# Patient Record
Sex: FEMALE | Race: WHITE
[De-identification: names, ages, dates, MRNs, and addresses within clinical notes are randomized per-mention and may not be internally consistent; named-entity substitution may affect disease eponyms.]

---

## 2020-02-24 ENCOUNTER — HOSPITAL ENCOUNTER (INPATIENT)
Dept: HOSPITAL 95 - ER | Age: 65
LOS: 9 days | Discharge: HOSPICE | DRG: 189 | End: 2020-03-04
Attending: INTERNAL MEDICINE | Admitting: INTERNAL MEDICINE
Payer: COMMERCIAL

## 2020-02-24 VITALS — HEIGHT: 64.02 IN | WEIGHT: 70.11 LBS | BODY MASS INDEX: 11.97 KG/M2

## 2020-02-24 DIAGNOSIS — Z66: ICD-10-CM

## 2020-02-24 DIAGNOSIS — F17.210: ICD-10-CM

## 2020-02-24 DIAGNOSIS — K21.9: ICD-10-CM

## 2020-02-24 DIAGNOSIS — J96.22: ICD-10-CM

## 2020-02-24 DIAGNOSIS — F41.0: ICD-10-CM

## 2020-02-24 DIAGNOSIS — J43.9: ICD-10-CM

## 2020-02-24 DIAGNOSIS — R64: ICD-10-CM

## 2020-02-24 DIAGNOSIS — E44.1: ICD-10-CM

## 2020-02-24 DIAGNOSIS — J96.21: Primary | ICD-10-CM

## 2020-02-24 LAB
ANION GAP SERPL CALCULATED.4IONS-SCNC: 2 MMOL/L (ref 6–16)
BASOPHILS # BLD AUTO: 0.02 K/MM3 (ref 0–0.23)
BASOPHILS NFR BLD AUTO: 0 % (ref 0–2)
BUN SERPL-MCNC: 20 MG/DL (ref 8–24)
CALCIUM SERPL-MCNC: 8.5 MG/DL (ref 8.5–10.1)
CHLORIDE SERPL-SCNC: 104 MMOL/L (ref 98–108)
CO2 SERPL-SCNC: 34 MMOL/L (ref 21–32)
CREAT SERPL-MCNC: 0.57 MG/DL (ref 0.4–1)
DEPRECATED RDW RBC AUTO: 54.9 FL (ref 35.1–46.3)
EOSINOPHIL # BLD AUTO: 0.03 K/MM3 (ref 0–0.68)
EOSINOPHIL NFR BLD AUTO: 0 % (ref 0–6)
ERYTHROCYTE [DISTWIDTH] IN BLOOD BY AUTOMATED COUNT: 14.5 % (ref 11.7–14.2)
GLUCOSE SERPL-MCNC: 167 MG/DL (ref 70–99)
HCT VFR BLD AUTO: 43.8 % (ref 33–51)
HGB BLD-MCNC: 13.6 G/DL (ref 11.5–16)
IMM GRANULOCYTES # BLD AUTO: 0.04 K/MM3 (ref 0–0.1)
IMM GRANULOCYTES NFR BLD AUTO: 0 % (ref 0–1)
LYMPHOCYTES # BLD AUTO: 1.01 K/MM3 (ref 0.84–5.2)
LYMPHOCYTES NFR BLD AUTO: 9 % (ref 21–46)
MCHC RBC AUTO-ENTMCNC: 31.1 G/DL (ref 31.5–36.5)
MCV RBC AUTO: 102 FL (ref 80–100)
MONOCYTES # BLD AUTO: 0.52 K/MM3 (ref 0.16–1.47)
MONOCYTES NFR BLD AUTO: 5 % (ref 4–13)
NEUTROPHILS # BLD AUTO: 9.23 K/MM3 (ref 1.96–9.15)
NEUTROPHILS NFR BLD AUTO: 85 % (ref 41–73)
NRBC # BLD AUTO: 0 K/MM3 (ref 0–0.02)
NRBC BLD AUTO-RTO: 0 /100 WBC (ref 0–0.2)
PLATELET # BLD AUTO: 263 K/MM3 (ref 150–400)
POTASSIUM SERPL-SCNC: 3.8 MMOL/L (ref 3.5–5.5)
SODIUM SERPL-SCNC: 140 MMOL/L (ref 136–145)

## 2020-02-24 NOTE — NUR
SUMMARY
PT ADMITTED TO THE FLOOR FROM THE ER, SHE IS ON 2 L O2 VIA NC WHICH IS HER
BASELINE, O2 SATS >95%. LUNGS ARE DIMINISHED THROUGH OUT
W/WHEEZES,LABORED,TACHY. PRN & SCHEDULED BREATHING TREATMENTS HAVE BEEN
ORDERED. PT IS A STAND BY ASSIST TO THE BSC. NS @75 ML/HR INFUSING. PT ENC TO
CALL FOR ASSISTANCE PRIOR TO GETTING OOB. CALL LIGHT IN REACH, WCTED & REPORT
TO NOC RN.

## 2020-02-24 NOTE — NUR
PERMISSION TO PROVIDE CARE
PRISCILA BUSTAMANTE, A STUDENT NURSE
RECEIVED PERMISSION TO PROVIIDE CARE TO THIS PATIENT ON
2- FROM THE CHARGE NURSE ON PCU

## 2020-02-25 LAB
ANION GAP SERPL CALCULATED.4IONS-SCNC: 4 MMOL/L (ref 6–16)
BASOPHILS # BLD AUTO: 0.01 K/MM3 (ref 0–0.23)
BASOPHILS NFR BLD AUTO: 0 % (ref 0–2)
BUN SERPL-MCNC: 16 MG/DL (ref 8–24)
CALCIUM SERPL-MCNC: 8.3 MG/DL (ref 8.5–10.1)
CHLORIDE SERPL-SCNC: 107 MMOL/L (ref 98–108)
CO2 SERPL-SCNC: 31 MMOL/L (ref 21–32)
CREAT SERPL-MCNC: 0.57 MG/DL (ref 0.4–1)
DEPRECATED RDW RBC AUTO: 53.2 FL (ref 35.1–46.3)
EOSINOPHIL # BLD AUTO: 0 K/MM3 (ref 0–0.68)
EOSINOPHIL NFR BLD AUTO: 0 % (ref 0–6)
ERYTHROCYTE [DISTWIDTH] IN BLOOD BY AUTOMATED COUNT: 14.4 % (ref 11.7–14.2)
GLUCOSE SERPL-MCNC: 119 MG/DL (ref 70–99)
HCT VFR BLD AUTO: 39.3 % (ref 33–51)
HGB BLD-MCNC: 12.5 G/DL (ref 11.5–16)
IMM GRANULOCYTES # BLD AUTO: 0.02 K/MM3 (ref 0–0.1)
IMM GRANULOCYTES NFR BLD AUTO: 0 % (ref 0–1)
LYMPHOCYTES # BLD AUTO: 0.47 K/MM3 (ref 0.84–5.2)
LYMPHOCYTES NFR BLD AUTO: 10 % (ref 21–46)
MCHC RBC AUTO-ENTMCNC: 31.8 G/DL (ref 31.5–36.5)
MCV RBC AUTO: 100 FL (ref 80–100)
MONOCYTES # BLD AUTO: 0.2 K/MM3 (ref 0.16–1.47)
MONOCYTES NFR BLD AUTO: 4 % (ref 4–13)
NEUTROPHILS # BLD AUTO: 3.88 K/MM3 (ref 1.96–9.15)
NEUTROPHILS NFR BLD AUTO: 85 % (ref 41–73)
NRBC # BLD AUTO: 0 K/MM3 (ref 0–0.02)
NRBC BLD AUTO-RTO: 0 /100 WBC (ref 0–0.2)
PLATELET # BLD AUTO: 240 K/MM3 (ref 150–400)
POTASSIUM SERPL-SCNC: 4.1 MMOL/L (ref 3.5–5.5)
SODIUM SERPL-SCNC: 142 MMOL/L (ref 136–145)

## 2020-02-25 NOTE — NUR
ASSUMED PATIENT CARE. PATIENT RECIEVING RESPIRATORY TREATMENT WITH RT AT
BEDSIDE. PATIENT ENDORSES THAT THE RESPIRATORY TREATMENTS ARE HELPING. NO
SIGNS OF ACUTE DISTRESS. WCTM.

## 2020-02-25 NOTE — NUR
NO ACUTE EVENTS THIS SHIFT. DECREASED WORK OF BREATHING THROUGH THIS SHIFT.
DECREASED SEVERITY OF WHEEZES NOTED BY THIS RN COMPARED TO LUNG SOUNDS AT
START OF SHIFT. PATIENT EXPRESSED FEELING LIKE HER "BREATHING IS GETTING
BETTER." PATIENT MAINTAINED O2 SATURATION IN THE HIGH 90S THIS SHIFT ON 2-2.5
L BY NASAL CANNULA.

## 2020-02-25 NOTE — NUR
PATIENT RESTING IN BED, SOB WITH SLIGHT ACTIVITY. ON 2L/NC WITH BIOX 93-95%
LUNG SOUNDS WHEEZES T/O, AND DIFFICULT TO TAKE DEEP BREATH. PATIENT UP TO BSC
WITHOUT DIFFICULTY. PATIENT PATRICIA PO WITHOUT DIFFICULTY.

## 2020-02-25 NOTE — NUR
NIGHT SHIFT SUMMARY
NO ACUTE CHANGES THIS SHIFT. PT AAOX4 AND PLEASANT. CALLS APPROPRIATELY FOR
ASSISTANCE. CONTINUES ON 2L O2 VIA NC WHICH IS HOME DOSE. IV SOLUMEDROL Q6. PT
REPORTS IMPROVEMENT IN BREATHING. STILL WHEEZY T/O. VSS, WILL CONTINUE TO
MONITOR.

## 2020-02-26 LAB
ANION GAP SERPL CALCULATED.4IONS-SCNC: 1 MMOL/L (ref 6–16)
BASOPHILS # BLD AUTO: 0.01 K/MM3 (ref 0–0.23)
BASOPHILS NFR BLD AUTO: 0 % (ref 0–2)
BUN SERPL-MCNC: 16 MG/DL (ref 8–24)
CALCIUM SERPL-MCNC: 8.4 MG/DL (ref 8.5–10.1)
CHLORIDE SERPL-SCNC: 106 MMOL/L (ref 98–108)
CO2 SERPL-SCNC: 34 MMOL/L (ref 21–32)
CREAT SERPL-MCNC: 0.53 MG/DL (ref 0.4–1)
DEPRECATED RDW RBC AUTO: 52.4 FL (ref 35.1–46.3)
EOSINOPHIL # BLD AUTO: 0 K/MM3 (ref 0–0.68)
EOSINOPHIL NFR BLD AUTO: 0 % (ref 0–6)
ERYTHROCYTE [DISTWIDTH] IN BLOOD BY AUTOMATED COUNT: 14.2 % (ref 11.7–14.2)
GLUCOSE SERPL-MCNC: 121 MG/DL (ref 70–99)
HCT VFR BLD AUTO: 38.2 % (ref 33–51)
HGB BLD-MCNC: 12.1 G/DL (ref 11.5–16)
IMM GRANULOCYTES # BLD AUTO: 0.02 K/MM3 (ref 0–0.1)
IMM GRANULOCYTES NFR BLD AUTO: 0 % (ref 0–1)
LYMPHOCYTES # BLD AUTO: 0.55 K/MM3 (ref 0.84–5.2)
LYMPHOCYTES NFR BLD AUTO: 8 % (ref 21–46)
MCHC RBC AUTO-ENTMCNC: 31.7 G/DL (ref 31.5–36.5)
MCV RBC AUTO: 100 FL (ref 80–100)
MONOCYTES # BLD AUTO: 0.28 K/MM3 (ref 0.16–1.47)
MONOCYTES NFR BLD AUTO: 4 % (ref 4–13)
NEUTROPHILS # BLD AUTO: 6.45 K/MM3 (ref 1.96–9.15)
NEUTROPHILS NFR BLD AUTO: 88 % (ref 41–73)
NRBC # BLD AUTO: 0 K/MM3 (ref 0–0.02)
NRBC BLD AUTO-RTO: 0 /100 WBC (ref 0–0.2)
PH BLDV: 7.45 [PH] (ref 7.34–7.37)
PLATELET # BLD AUTO: 233 K/MM3 (ref 150–400)
POTASSIUM SERPL-SCNC: 4.2 MMOL/L (ref 3.5–5.5)
SODIUM SERPL-SCNC: 141 MMOL/L (ref 136–145)

## 2020-02-26 NOTE — NUR
TRANSFER NOTE
HANDOFF RECEIVED FROM PCU NURSE CASEY. PT TRANSFERED TO MED FLOOR VIA
WHEELCHAIR. PERSONAL POSSESSIONS BROUGHT WITH PT. PT ORIENTED TO UNIT. CALL
BUTTON WITHIN REACH.

## 2020-02-26 NOTE — NUR
SUMMARY
PATIENT SLEEPING OFF AND ON T/O NIGHT. LUNG SOUNDS REMAIN DECREASED WITH
WHEEZES T/O. SOB WITH ACTIVITY, BUT IS ABLE TO TRANSFER SELF TO BSC WITHOUT
DIFFICULTY. PATIENT VERBALIZED THAT SHE IS FEELING BETTER THIS MORNING AND IS
WANTING TO TRY TO TAKE A SHOWER TODAY.

## 2020-02-26 NOTE — NUR
ASSUMED PATIENT CARE. PATIENT RESTING COMFORTABLY IN BED, COMVERSING WITH
NURSING STAFF. NO SIGNS OF ACUTE RESPIRATORY DISTRESS, WCTM.

## 2020-02-27 NOTE — NUR
SHIFT SUMMARY
ADMITTED FOR RESPIRATORY FAILURE. FOUND TO HAVE COPD EXACERBATION. DNR CODE.
ORAL PREDNISONE AND ZITHROMAX IS IN EMAR. RT TX'S ARE SCHEDULED. I DID CALL
NIGHT HOSPITALIST FOR [facility] TID PRN THIS SHIFT DUE TO HER SEVERE
COUGHING SPELLS. SHE IS ON A REGULAR DIET, A&O X4, AND ON 2 LPM O2 WHICH IS
HER BASELINE AT HOME. SHE LIVES WITH HER , AND SHE IS A CURRENT SMOKER.
STANDBY ASSIST TO BATHROOM DUE TO SEVERE DYSPNEA. HX: COPD, MALNUTRITION,
GERD. QUANTIFERON GOLD TEST RESULTS ARE PENDING (RE:LATENT TB).

## 2020-02-27 NOTE — NUR
RECEIVED BEDSIDE REPORT FROM NAY CHEUNG. PT SITTING UP IN BED. RESP SHALLOW ON
2L VIA NC, 2L IS PT'S BASELINE AT HOME. NOTED SOB AT REST. PT STATES SHE'S
MUCH BETTER THAN WHEN SHE CAME IN AND IS HOPING TO BE DISCHARGED IN THE
MORNING. WILL MONITOR AND PROVIDE CARE T/O SHIFT. CALL LT IN REACH.

## 2020-02-28 NOTE — NUR
SHIFT SUMMARY
PT AWAKE DURING SHIFT REPORT, BECOMING A LITTLE SOB TRYING TO TALK AND SIT UP.
PER SHIFT REPORT, PT FEELS THAT SHE IS AT HER BASELINE. PT CURRENTLY ON 2L NC
AND HAD JUST GOTTEN O2 AT HOME 2 DAYS BEFORE BEING ADMITTED TO HOSPITAL. PT
ALSO REPORTED THAT SHE WAS STILL SMOKING UNTIL MONDAY. PT ENCOURAGED TO QUIT.
DR SAAB IN TO SEE PT; OFFERED PT NICOTINE PATCH OR SOMETHING ELSE TO HELP
HER QUIT. PT DECLINED AND REPORTED THAT SHE DIDN'T NEED ANYTHING. PT HAD
WANTED TO GO HOME. HOWEVER, AFTER GETTING UP TO [facility], PT BECAME VERY SOB WITH
HIGH ANXIETY. DR SAAB ENCOURAGED PT TO STAY ONE MORE DAY WHILE SWITCHING TO
PO STEROIDS. PT REQUESTED SOMETHING TO HELP HER "TAKE A NAP", AS SHE HAS NOT
BEEN ABLE TO SLEEP WHILE IN HOSPITAL, WHICH "HAS INCREASED MY ANXIETY". ATIVAN
0.5 MG X1 ORDERED AND GIVEN. PT THEN AGREEABLE TO WHAT DR SAAB THOUGHT WAS
BEST. PT HAS NOT BEEN EATING MUCH AT ALL; PT ENCOURAGED TO IMPROVE NUTRITION.
IDEAS DISCUSSED ON HOW TO BEST DO THAT. PT RESTING QUIETLY AT THIS TIME. CALL
LT IN REACH. ABLE TO MAKE NEEDS KNOWN.

## 2020-02-28 NOTE — NUR
SHIFT SUMMMARY:
PT CONTINUES TO BE SOB, HOWEVER, IS AT BASELINE FOR OXYGEN NEEDS AT 2L VIA NC.
NO COMPLAINTS OF PAIN, OR NAUSEA. REPORTED NO SPUTUM DURING SHIFT. COUGHED
VERY LITTLE DURING SHIFT. NO ACUTE CHANGES.  RESTED COMFORTABLY.
WILL CONTINUE TO MONITOR AND PROVIDE CARE UNTIL SHIFT REPORT.

## 2020-02-28 NOTE — NUR
PT WAS ABLE TO REST WELL AFTER ATIVAN GIVEN EARLIER TODAY. PT FELT BETTER WHEN
WAKING, BUT LATER BECAME SOB AND NEEDED RT TX'S. RT NOTIFIED. PT SITTING ON
SIDE OF BED BECOMING MORE ANXIOUS WAITING FOR RT. BY THE TIME RT TX'S WERE
COMPLETE, PT'S ANXIETY CONTINUED TO INCREASE STATING TO RT THAT SHE WASN'T
GOING TO MAKE IT. THIS RN WAS NOTIFIED BY RT OF PT'S CONTINUING STATUS. DR SAAB NOTIFIED OF PT'S DECLINE. NEW ORDERS RECEIVED. PT LUNGS T/O WITH
INCREASED WHEEZES AND WET. ATIVAN X1 GIVEN AGAIN. ORDERS TO TX PT TO PCU ON
BIPAP IF NEEDED. PT APPEARS TO HAVE CALMED DOWN SOME AT THIS TIME. PT REPORTED
THAT SHE DOES THIS SAME THING AT HOME AND WAITS TOO LONG TO CALL 911, WHEN SHE
CAN'T BREATHE. REPORT GIVEN TO ONCOMING RN.

## 2020-02-29 LAB
GAMMA INTERFERON BACKGROUND BLD IA-ACNC: 0.1 IU/ML
M TB IFN-G CD4+ BCKGRND COR BLD-ACNC: 0.22 IU/ML
M TB IFN-G CD4+CD8+ BCKGRND COR BLD-ACNC: 0.09 IU/ML
MITOGEN IGNF BLD-ACNC: 6.28 IU/ML

## 2020-02-29 NOTE — NUR
Rn summary:  Patient is alert and oriented, very soft spoken due to
respiratory distress.  Pt was on 5 liters at beginning of shift, down to 4
liters, sats 97% at rest.  Pt has been able to sleep most of shift.  Did
receive ativan at end of day shift.  Pt has been calm and free of distress.
Breath sounds with crackles mid to upper lobes, diminished in bases with fine
expiratory wheezes, course wheezes anteriorly.
Pt was awake about 0330, she was dangling at bedside eating a snack.  SOB.
Pt had walked into BR instead of using BSC to conserve respiratory reserves.
Bed alarm on for safety.  Call light in reach.

## 2020-02-29 NOTE — NUR
NO ACUTE CHANGES NOTED. PATIENT IS DOING WELL ON 1 LITER O2 VIA NASAL CANULA.
NO COMPLAINTS OF SHORTNESS OF BREATH OR ANXIETY NOTED. NO CURRENT COMPLAINTS
OF PAIN OR DISCOMFORT NOTED. WILL CONTINUE TO MONITOR FOR CHANGES.

## 2020-03-01 LAB — PH BLDA: 7.34 [PH] (ref 7.35–7.45)

## 2020-03-01 NOTE — NUR
Update
Pt took all PO medications whole with water. No coughing or difficulty
swallowing noted. Pt laughing and engaging with family, though appears
frail and weak.

Family left bedside around 2200 and BIPAP placed on Pt and sleep
encouraged. Jelly placed to pt's nose for skin protection.  Family names
and numbers placed on whiteboard in room and pt's daughter in law to stay
the night. Recliner placed in room and set up for pt's daughter. Will
continue to monitor.

## 2020-03-01 NOTE — NUR
ABG RESULTS CALLED TO DR SAAB. PER DR SAAB GO AHEAD AND GIVE ORAL ATIVAN
0.5MG AND HE WILL BE UP TO SEE PT AND FAMILY SHORTLY.

## 2020-03-01 NOTE — NUR
PT CONT TO STRUGGLE TO BREATHE, PT INITIALLY TOOK BIPAP OFF APROX 1 HR AFTER
PLACING AND REPORTS SHE COULD NOT TOLERATE IT. FAMILY AND DR SAAB AT BEDSIDE
AND ENCOURAGED PT TO TRY AGAIN. PT PLACED BACK ON BIPAP REQUIRING A 6L BLEED
TO GET SATS TO 90%. PT DROWSY, WILL OPEN EYES TO VERBAK STIMULI BUT DOES NOT
VERBALLY RESPOND. RESP TACHY AS WELL AS HR IN 'S. REPORT CALLED TO PCU,
AWAITING TRANSFER AT THIS TIME. FAMILY AT BEDSIDE.

## 2020-03-01 NOTE — NUR
SHIFT SUMMARY
ASSUMED CARE OF PT AT 1900. PT IS A/O X4, DENIES N/T IN EXTRMIEITES AT THIS
TIME. HEART SOUNDS REGULAR, DENIES CP AT THIS TIME. LUNG SOUNDS CRACKLES IN
THE APEXS AND WHEEZING T/O. PT STATES THAT SHE THOUGHT SHE WAS GETTING TO MUCH
O2 AND WANTED TO TAKE OFF THE NC BUT UPON ASSESSMENT PT SATURATION WAS IN THE
80'S, PT PUT THE NC BACK IN HER NOSE AND HE SATURATION WENT TO 91% ON 2L, RT
CONSULTED WITH CARE.
 
NO ACUTE CHANGES NOTED T/O THE NIGHT, PT SLEPT T/O THE NIGHT WITHOUT ANY
COMPLAINTS. CALL LIGHT IN REACH, BED IN LOWEST POSTION, WILL CONTINUE TO
MONITOR UNTIL DAYSHIFT NURSE ARRIVES.

## 2020-03-01 NOTE — NUR
Pt resting in bed upon arrival. Pt denies pain at this time. Pt appears
dyspneic as evidenced by respiratory rate and work of breathing. Engaged in
therapeutic discussion regarding goals of care including hospice and comfort
care as an option. Pt and family do not indicate what they are considering at
this time. Pt is focussed on her axiety at this time. Educated Pt on
relaxation and imagery technique to help manage anxiety. Pt will be able to
receive Ativan in approximately 1 hour. Pt is agreeable with waiting.
 
Spoke with bedside RN's Stella. Discussed case and concerns.
 
Palliative Care will remain available.

## 2020-03-01 NOTE — NUR
PATIENT ARRIVED TO THIS UNIT THIS EVENING. PATIENT IS IN END STAGE COPD, ON
BIPAP. FAMILY WAS AT BEDSIDE THROUGHOUT THIS PART OF SHIFT, PALLIATIVE CARE
FOLLOWING. CONVERSATIONS TO FOLLOW REGARDING POSSIBLE COMFORT CARE STATUS.
ATIVAN WAS IN USE THIS MORNING ON MED FLOOR, PATIENT HAS NOT NEEDED ANXIETY
MANAGEMENT SINCE ARRIVING TO PCU.

## 2020-03-01 NOTE — NUR
Assumed Care
Pt presents asleep, wearing BIPAP, family at bedside tearful. Family
expressing to this RN about "a rough day" stating "they say she's going
to die." This RN offered therapeutic listening and communication. This RN
expressed deep sympathy for the difficult time the whole family is going
through. This RN offered hugs and words of encouragement. Family
responded positively to communication offered. This RN also educated
family on COPD.
 
The pt's VSS, opens eyes to verbal stimulous but not engaging in
conversation. See shift assessment for detailed systems assessment.
 
Will continue to montior and offer support for pt and family.

## 2020-03-01 NOTE — NUR
Update
RT at bedside for assessment. Pt with increased alertness as compared to
previous interaction. Bipap removed from pt and NC placed, pt tolerating
7L NC. Engaging in converstaion with family. Alert and oriented to name,
, place, following directions, family. Pt tearful stating "I don't
want to die". This RN at bedside with family and pt offering support and
care. Oral care performed and pt's hair brushed. Will continue to
monitor.

## 2020-03-01 NOTE — NUR
PT CALLED AND REPORTED FEELING SHORT OF BREATH AND DIFFICULTY WITH BREATHING.
LS DIMINISHED WITH WHEEZES AND CRACKLES NOTED. RT CALLED FOR BREATHING TX. PT
HAD PREVIOUSLY WITH SATS AT 87% ON 2L, 02 INCREASED TO 3L. PT CURRENTLY WITH
SATS BACK AT 87% ON 3L, INCREASED TO 90% ON 3L WITH BREATHING TX. DR SAAB IN
TO SEE PT AND REPORTS WILL RETURN. PT HAVING DIFFICULTY SPEAKING IN SENTENCES
AND ONLY ABLE TO SAY 2-3 WORDS AT A TIME. WILL CONT TO MONITOR AT THIS TIME.
PT DOES REPORT BREATHING TX HELPED.

## 2020-03-01 NOTE — NUR
ASSUMED PATIENT CARE. PATIENT RESTING IN BED, EQUAL BILATERAL CHEST RISE WITH
BREATH. PATIENT HAS BIPAP ON. FAMILY AT BEDSIDE, PALLIATIVE CARE PRESENT AT
BEDSIDE. WCTM.

## 2020-03-01 NOTE — NUR
FAMILY AT BEDSIDE AND ARE VERY CONCERNED ABOUT PT AND REPORT SHE LOOKS WORSE
THEN SHE DID WHEN SHE CAME IN. DR SAAB IN TO SEE PT AND SPOKE WITH PT AND
FAMILY. ABG ORDERED AND CHEST XRAY. PALLIATIVE CARE AND DIETICIAN CONSULT ALSO
PLACED, PT WITH NO APPETITE AND VERY POOR ORAL INTAKE, PT REPORTS SHE DOES NOT
EAT HOME EITHER. PT DID AGREE TO TAKE AN ENSURE.

## 2020-03-02 NOTE — NUR
NEAR FALL EVENT
Pulse oximetry alarm sounding, this RN to room to assess pt and found pt
walking to bathroom without assistance. Pt's daughter in law in room at
this time. Pt lost balance and this RN assisted pt to find balance again.
This was a near fall event. Pt walked back to bed by this RN. Pt and Pt's
daughter in law educated strongly on need to call for assistance with
ambulation. Pt verbalized understanding. Call light placed in bed, bed
alarm on. Will continue to closely monitor. This pt is high fall risk.

## 2020-03-02 NOTE — NUR
PATIENT WAS ON NASAL CANNULA 2L MAJORITY OF SHIFT, THIS AFTERNOON HAD
INCREASED WORK OF BREATHING AFTER ACTIVITY AND REQUIRED APPLICATION OF BIPAP.
PLAN IS TO CONTINUE ON IV STEROIDS AND RT BREATHING TX.  PATIENT HAS INCREASED
ANXIETY WITH BIPAP APPLICATION, ATIVAN GIVEN AND PROVED EFFECTIVE FOR ANXIETY
MANAGEMENT AS WELL AS APPLICATION OF COOL WASHCLOTHS AND DISTRACTION.
PATIENT'S APPETITE REMAINS MINIMAL, ENCOURAGED PO INTAKE WITH REQUEST FOR EGGS
IN THE MORNING. DIETICIAN FOLLOWING. PULM TO CONSULT.  PATIENT'S BP HAS
TRENDED UP THIS SHIFT, FROM 135/79 /89. MONITORED, PATIENT DENIED CHEST
PAIN, DENIED BACK PAIN.

## 2020-03-02 NOTE — NUR
Pt resting in bed upon arrival and just finished a breathing treatment. Pt
denies pain at this time. Pt appears dyspneic as evidenced by work of
breathing when speaking. Engaged in therapeutic listening as Pt discusses her
condition and prognosis. She asks "Are they going to put me on hospice"?
Educated Pt that hospice is appropriate for her but ultimately it needs to
line up with her goals with her making the decision. Educated Pt if she
chooses hospice, it does not mean that she will pass away imediately and that
it becomes her journey. Suggested to Pt having a conversation with family and
allow concerns and fears to be expressed. No other concerns reported at this
time.
 
Continued conversation with Pt's daughter in law outside of Pt's room.
Answered questions and concerns.
 
Palliative Care will remain available.

## 2020-03-02 NOTE — NUR
ASSUMED PATIENT CARE. PATIENT RESTING COMFORTABLY IN BED, NASAL CANNUAL ON AT
2L. PATIENT CONVERSING COMFORTABLY WITH NURSING STAFF. EQUAL BILATERAL CHEST
RISE WITH BREATH, NO SIGNS OF ACUTE DISTRESS. WCTM.

## 2020-03-03 LAB
BASOPHILS # BLD AUTO: 0.01 K/MM3 (ref 0–0.23)
BASOPHILS NFR BLD AUTO: 0 % (ref 0–2)
DEPRECATED RDW RBC AUTO: 49.6 FL (ref 35.1–46.3)
EOSINOPHIL # BLD AUTO: 0 K/MM3 (ref 0–0.68)
EOSINOPHIL NFR BLD AUTO: 0 % (ref 0–6)
ERYTHROCYTE [DISTWIDTH] IN BLOOD BY AUTOMATED COUNT: 13.2 % (ref 11.7–14.2)
HCT VFR BLD AUTO: 43.5 % (ref 33–51)
HGB BLD-MCNC: 13.4 G/DL (ref 11.5–16)
IMM GRANULOCYTES # BLD AUTO: 0.03 K/MM3 (ref 0–0.1)
IMM GRANULOCYTES NFR BLD AUTO: 0 % (ref 0–1)
LYMPHOCYTES # BLD AUTO: 0.48 K/MM3 (ref 0.84–5.2)
LYMPHOCYTES NFR BLD AUTO: 5 % (ref 21–46)
MCHC RBC AUTO-ENTMCNC: 30.8 G/DL (ref 31.5–36.5)
MCV RBC AUTO: 101 FL (ref 80–100)
MONOCYTES # BLD AUTO: 0.37 K/MM3 (ref 0.16–1.47)
MONOCYTES NFR BLD AUTO: 4 % (ref 4–13)
NEUTROPHILS # BLD AUTO: 9.31 K/MM3 (ref 1.96–9.15)
NEUTROPHILS NFR BLD AUTO: 91 % (ref 41–73)
NRBC # BLD AUTO: 0 K/MM3 (ref 0–0.02)
NRBC BLD AUTO-RTO: 0 /100 WBC (ref 0–0.2)
PH BLDA: 7.34 [PH] (ref 7.35–7.45)
PLATELET # BLD AUTO: 248 K/MM3 (ref 150–400)

## 2020-03-03 NOTE — NUR
03/03/20   0618   PULSE OXIMETER ALARMING AND PT HAD TAKEN OFF BIPAP. O2 SATS
IN HIGH 50'S TO 60'S.  RESP.CARE AND 2 RN'S IN ROOM AS PRIMARY RN WAS IN
ANOTHER PT'S ROOM DOWN THE GRANT. RT WAS ABLE TO BRING O2 SATS UP. SEE RT
NOTES. PT NOW AT 96% O2 WITH BIPAP BLEEDING AT 9LPM. PT ASSISTED UP TO BSC FOR
VOIDING AND ASSISTED BACK TO BED WITHOUT PROBLEMS. FAMILY AT BEDSIDE.

## 2020-03-03 NOTE — NUR
Pt visit this afternoon. Pt reports she has chosen to D/C home with hospice.
Confirmed Pt's understanding of hospice philosophy with Pt's understanding
appropriate. Family is agreeable. Syd Vazquez will present hospice
agencies available to choose. Family reports they will discuss further on if
comfort measures should be started during Pt's hospital stay. No other
concerns reported at this time.
 
Palliative Care will remain available.

## 2020-03-03 NOTE — NUR
SHIFT SUMMARY
PT ALERT AND ORIENTED, BUT LETHARGIC. O2 SATS HAVE REMAINED ABOVE 90% WITH
BIPAP WITH 5-9L BLEED IN DEPENDING ON NEEDS. PT DESATURATES WIT MINIMAL
ACTIVITY AND CAN TOLERATE BREAKS FROM BIPAP ON 4L NC. PT DENIES ANY PAIN. BP
STABLE. HR NSR. PT REQUESTED INFORMATION ON HOSPICE TODAY. DISCHARGE PLANNING
HAS ARRANGED DC HOME ON HOSPICE TOMORROW. FAMILY AT BEDSIDE. BED ALARM ON FOR
SAFETY.

## 2020-03-03 NOTE — NUR
03/03/20   0520   PT SLEEPING AND WAS PUT BACK ON BIPAP PER RESP.CARE AFTER
ABG REPORT SHOWED CO2= 79 ON O2 N/C AT 3LPM. VITALS STABLE AND NO C/O S/S OR
PROBLEMS FROM PATIENT. FAMILY AT BEDSIDE ALL SHIFT AND ASSIST HER WITH CARE.
SOB NOTED EARLIER IN SHIFT WITH ANY ACTIVITY INCLUDING TALKING.

## 2020-03-03 NOTE — NUR
Initial spiritual care note:
 
Met with Paola alone at bedside.  She appears quite frail.  She admits she is
tired of suffering and knows she is nearing end-of-life.  She is primarily
concerned about burdening her son, as he is suffering some physical ailments.
Her DILShanti is a great support to pt.  Paola's  battles numerous
addictions and has been the cause of great turmoil at home.  I gently
explained hospice services.  Paola very much wants to be home.  I provided
 and assurance of care.  SAMI arrived and joined the conversation.
Shanti agrees that pt has suffered "long enough" and promises to "take care of
things at home."  There is a deep bond between these two women.  I affirmed
obvious love, provided anticipatory berevament  to good effect.
INformed RN of pt/family wishes.  I will remain available.

## 2020-03-03 NOTE — NUR
Supportive therapeutic visit this AM. Pt resting in bed and denies pain at
this time. Pt appears dyspneic as evidenced of work of breathing, accesory
muscle use, and speaking in short sentences. Listened as Pt reports very
little appetite. Offered therapeutic listening. No family at bedside
currently. Instructed Pt this RN will make another visit when family is
present.
 
Spoke with bedside RN Karlie and discussed case.
 
Palliative Care will remain available.

## 2020-03-04 NOTE — NUR
SHIFT SUMMARY
 
PT A&O X4, PLEASANT, CALM & COOPERATIVE. PT'S DAUGHTER-IN-LAW AT BEDSIDE T/O
SHIFT. PT DENIES PAIN, BUT REPORTS DISCOMFORT W/ BREATHING. LUNG SOUNDS W/
EXPIRATORY WHEEZE NOTED. SPO2 > 90% ON 2-15L HI-ADAM NC OR BIPAP 15/8 W/ 15L
BLEED IN THIS SHIFT. PT O2 NEEDS FLUCTUATING GREATLY T/O SHIFT WHILE AT REST
AND W/ ACTIVITY. PT LABORING TO BREATHE W/ PT & PT DAUGHTER-IN-LAW EXPRESSING
READINESS TO BEGIN COMFORT CARE MEASURES TONIGHT, BUT ALSO WANTING TO CONTINUE
CURRENT MEDICATION TX. CALL TO NP RHINA W/ UPDATE ON PT'S LABOROUS BREATHING
AND REQUEST TOWARDS COMFORT CARE. NP RHINA W/ NEW ORDERS FOR PRN MORPHINE,
SEE EMAR. PT REQUESTING 1 MG IV MORPHINE, ADMINISTERED PER PT REQUEST/EMAR
X2 THIS SHIFT. PT REPORTING IMPROVEMENT W/ BREATHING AFTER MORPHINE
ADMINISTRATION. PT NOT TOLERATING BIPAP THIS SHIFT DESPITE MULTIPLE ATTEMPTS
TO WEAR. PT SHARING PLANS FOR GOING HOME W/ HOSPICE TODAY, STATING "I JUST
WANT TO BE COMFORTABLE, AND I JUST WANT TO MAKE IT HOME TO SEE MY FAMILY."
PT DISCUSSING END OF LIFE FINANCIAL PLANS W/ DAUGHTER-IN-LAW AT BEDSIDE.
DAUGHTER-IN-LAW TEARFUL, OFFERING SUPPORT TO PT. WILL CONTINUE TO MONITOR AND
PROVIDE CARE UNTIL REPORT OFF TO DAY SHIFT RN.

## 2020-03-04 NOTE — NUR
PT DISCHARGED WITH HOME HOSPICE. [facility] CAME TO BEDSIDE AND PROVIDED
INFORMATION FOR HOME CARE. BELONGINGS GATHERED AND TRANSPORTED WITH PT/FAMILY.
PT WAS TRANSPORTED HOME BY AMBULANCE. PT CONTINUE ON 2L O2 VIA NC.

## 2020-03-06 LAB
A1AT PHENOTYP SERPL IFE: (no result)
ALPHA-1-ANTITRYPSIN, SERUM: 116 MG/DL (ref 101–187)